# Patient Record
Sex: MALE | Race: WHITE | NOT HISPANIC OR LATINO | Employment: FULL TIME | URBAN - METROPOLITAN AREA
[De-identification: names, ages, dates, MRNs, and addresses within clinical notes are randomized per-mention and may not be internally consistent; named-entity substitution may affect disease eponyms.]

---

## 2022-04-10 ENCOUNTER — HISTORICAL (OUTPATIENT)
Dept: ADMINISTRATIVE | Facility: HOSPITAL | Age: 47
End: 2022-04-10
Payer: COMMERCIAL

## 2022-04-29 VITALS
SYSTOLIC BLOOD PRESSURE: 124 MMHG | HEIGHT: 74 IN | OXYGEN SATURATION: 97 % | WEIGHT: 223 LBS | DIASTOLIC BLOOD PRESSURE: 84 MMHG | BODY MASS INDEX: 28.62 KG/M2

## 2022-07-18 ENCOUNTER — CLINICAL SUPPORT (OUTPATIENT)
Dept: PREADMISSION TESTING | Facility: HOSPITAL | Age: 47
End: 2022-07-18
Attending: SURGERY
Payer: COMMERCIAL

## 2022-07-18 DIAGNOSIS — Z01.818 PREOPERATIVE EXAMINATION, UNSPECIFIED: Primary | ICD-10-CM

## 2022-07-18 DIAGNOSIS — Z01.818 PREOPERATIVE EXAMINATION, UNSPECIFIED: ICD-10-CM

## 2022-07-18 PROCEDURE — 93005 ELECTROCARDIOGRAM TRACING: CPT

## 2022-07-18 PROCEDURE — 93010 ELECTROCARDIOGRAM REPORT: CPT | Mod: ,,, | Performed by: INTERNAL MEDICINE

## 2022-07-18 PROCEDURE — 93010 EKG 12-LEAD: ICD-10-PCS | Mod: ,,, | Performed by: INTERNAL MEDICINE

## 2022-07-22 PROBLEM — K43.9 VENTRAL HERNIA: Status: ACTIVE | Noted: 2022-07-22

## 2022-07-22 PROBLEM — K43.9 VENTRAL HERNIA: Status: RESOLVED | Noted: 2022-07-22 | Resolved: 2022-07-22

## 2022-11-22 DIAGNOSIS — G25.81 RESTLESS LEG SYNDROME: Primary | ICD-10-CM

## 2022-11-22 RX ORDER — ALPRAZOLAM 0.5 MG/1
TABLET ORAL
Qty: 60 TABLET | Refills: 3 | Status: SHIPPED | OUTPATIENT
Start: 2022-11-22 | End: 2023-03-21 | Stop reason: SDUPTHER

## 2023-04-25 ENCOUNTER — OFFICE VISIT (OUTPATIENT)
Dept: NEUROLOGY | Facility: CLINIC | Age: 48
End: 2023-04-25
Payer: COMMERCIAL

## 2023-04-25 VITALS
DIASTOLIC BLOOD PRESSURE: 88 MMHG | BODY MASS INDEX: 28.88 KG/M2 | SYSTOLIC BLOOD PRESSURE: 144 MMHG | HEIGHT: 74 IN | WEIGHT: 225 LBS

## 2023-04-25 DIAGNOSIS — G25.81 RLS (RESTLESS LEGS SYNDROME): ICD-10-CM

## 2023-04-25 PROCEDURE — 99999 PR PBB SHADOW E&M-EST. PATIENT-LVL III: ICD-10-PCS | Mod: PBBFAC,,, | Performed by: NURSE PRACTITIONER

## 2023-04-25 PROCEDURE — 3077F SYST BP >= 140 MM HG: CPT | Mod: CPTII,S$GLB,, | Performed by: NURSE PRACTITIONER

## 2023-04-25 PROCEDURE — 4010F PR ACE/ARB THEARPY RXD/TAKEN: ICD-10-PCS | Mod: CPTII,S$GLB,, | Performed by: NURSE PRACTITIONER

## 2023-04-25 PROCEDURE — 1159F MED LIST DOCD IN RCRD: CPT | Mod: CPTII,S$GLB,, | Performed by: NURSE PRACTITIONER

## 2023-04-25 PROCEDURE — 1159F PR MEDICATION LIST DOCUMENTED IN MEDICAL RECORD: ICD-10-PCS | Mod: CPTII,S$GLB,, | Performed by: NURSE PRACTITIONER

## 2023-04-25 PROCEDURE — 3079F PR MOST RECENT DIASTOLIC BLOOD PRESSURE 80-89 MM HG: ICD-10-PCS | Mod: CPTII,S$GLB,, | Performed by: NURSE PRACTITIONER

## 2023-04-25 PROCEDURE — 4010F ACE/ARB THERAPY RXD/TAKEN: CPT | Mod: CPTII,S$GLB,, | Performed by: NURSE PRACTITIONER

## 2023-04-25 PROCEDURE — 99214 PR OFFICE/OUTPT VISIT, EST, LEVL IV, 30-39 MIN: ICD-10-PCS | Mod: S$GLB,,, | Performed by: NURSE PRACTITIONER

## 2023-04-25 PROCEDURE — 3079F DIAST BP 80-89 MM HG: CPT | Mod: CPTII,S$GLB,, | Performed by: NURSE PRACTITIONER

## 2023-04-25 PROCEDURE — 99999 PR PBB SHADOW E&M-EST. PATIENT-LVL III: CPT | Mod: PBBFAC,,, | Performed by: NURSE PRACTITIONER

## 2023-04-25 PROCEDURE — 3077F PR MOST RECENT SYSTOLIC BLOOD PRESSURE >= 140 MM HG: ICD-10-PCS | Mod: CPTII,S$GLB,, | Performed by: NURSE PRACTITIONER

## 2023-04-25 PROCEDURE — 3008F PR BODY MASS INDEX (BMI) DOCUMENTED: ICD-10-PCS | Mod: CPTII,S$GLB,, | Performed by: NURSE PRACTITIONER

## 2023-04-25 PROCEDURE — 3008F BODY MASS INDEX DOCD: CPT | Mod: CPTII,S$GLB,, | Performed by: NURSE PRACTITIONER

## 2023-04-25 PROCEDURE — 99214 OFFICE O/P EST MOD 30 MIN: CPT | Mod: S$GLB,,, | Performed by: NURSE PRACTITIONER

## 2023-04-25 RX ORDER — ALPRAZOLAM 1 MG/1
1 TABLET ORAL NIGHTLY PRN
Qty: 90 TABLET | Refills: 1 | Status: SHIPPED | OUTPATIENT
Start: 2023-04-25 | End: 2023-10-19 | Stop reason: SDUPTHER

## 2023-04-25 RX ORDER — ROPINIROLE 0.5 MG/1
TABLET, FILM COATED ORAL
Qty: 270 TABLET | Refills: 1 | Status: SHIPPED | OUTPATIENT
Start: 2023-04-25 | End: 2023-10-19 | Stop reason: SDUPTHER

## 2023-04-25 RX ORDER — LOSARTAN POTASSIUM 25 MG/1
25 TABLET ORAL EVERY MORNING
COMMUNITY
Start: 2023-04-06

## 2023-04-25 RX ORDER — HYDROCHLOROTHIAZIDE 12.5 MG/1
12.5 CAPSULE ORAL EVERY MORNING
COMMUNITY
Start: 2023-04-07

## 2023-04-25 NOTE — PROGRESS NOTES
Established Patient   SUBJECTIVE:    Patient ID: Ronal Pack , 47 y.o.    Past Medical History:   Diagnosis Date    Restless legs     Ventral hernia        Past Surgical History:   Procedure Laterality Date    KNEE CARTILAGE SURGERY Left 2007    LAPAROSCOPIC REPAIR OF VENTRAL HERNIA N/A 7/22/2022    Procedure: REPAIR, HERNIA, VENTRAL, LAPAROSCOPIC;  Surgeon: Harsh Murphy MD;  Location: [facility];  Service: General;  Laterality: N/A;       History reviewed. No pertinent family history.    Social History     Socioeconomic History    Marital status:    Tobacco Use    Smoking status: Never    Smokeless tobacco: Never   Substance and Sexual Activity    Drug use: Never       Review of patient's allergies indicates:  No Known Allergies    Chief Complaint: RLS f/u    History of Present Illness:   Pt states he is doing well with his medication regimen. States he may have 2 night of RLS/ week.     Takes Requip 0.5 mg 1 hr prior to bed, Pramipexole 0.25mg 30 min after taking Requip, then Xanax 0.5 mg, 30 min after taking Pramipexole.     Asking about making a change with medication    Review of Systems - as per HPI, otherwise a balanced 10 systems review is negative.      Current Medications:    Current Outpatient Medications:     ALPRAZolam (XANAX) 0.5 MG tablet, TAKE ONE TO TWO TABLETS BY MOUTH NIGHTLY, Disp: 60 tablet, Rfl: 1    pramipexole (MIRAPEX) 0.25 MG tablet, TAKE 1 TABLET BY MOUTH DAILY 1 HOUR BEFORE BEDTIME EVERY NIGHT ONSET, Disp: , Rfl:     rOPINIRole (REQUIP) 0.5 MG tablet, TAKE 1 TABLET BY MOUTH DAILY 2 HOURS BEFORE BEDTIME EVERY NIGHT, Disp: , Rfl:     hydroCHLOROthiazide (MICROZIDE) 12.5 mg capsule, Take 12.5 mg by mouth every morning., Disp: , Rfl:     HYDROcodone-acetaminophen (NORCO) 7.5-325 mg per tablet, Take 1 tablet by mouth every 6 (six) hours as needed for Pain. (Patient not taking: Reported on 4/25/2023), Disp: 12 tablet, Rfl: 0    losartan (COZAAR) 25 MG tablet, Take 25 mg  "by mouth every morning., Disp: , Rfl:       OBJECTIVE:    Vitals:  BP (!) 144/88   Ht 6' 2" (1.88 m)   Wt 102.1 kg (225 lb)   BMI 28.89 kg/m²      Physical Exam:  Constitutional  he appears well-developed and well-nourished. he is well groomed.    Accompanied by - self  Appearance - well appearing, no apparent distress, unassisted  Skin- no obvious lesions noted    Neurologic  Cortical function - The patient is alert, attentive, and oriented  Speech - clear   Cranial nerves:  CN 3, 4, 6 EOMs - normal. No ptosis or lateral gaze deviation  CN 7 - no face asymmetry; normal eye closure and smile  CN 8 - hearing is grossly normal  Motor - grossly normal  Gait - unassisted; posture upright. gait is steady with normal steps    Review of Data:          Labs:  No visits with results within 3 Month(s) from this visit.   Latest known visit with results is:   Lab Visit on 07/18/2022   Component Date Value Ref Range Status    Sodium Level 07/18/2022 141  136 - 145 mmol/L Final    Potassium Level 07/18/2022 4.4  3.5 - 5.1 mmol/L Final    Chloride 07/18/2022 104  98 - 107 mmol/L Final    Carbon Dioxide 07/18/2022 26  22 - 29 mmol/L Final    Glucose Level 07/18/2022 78  74 - 100 mg/dL Final    Blood Urea Nitrogen 07/18/2022 16.4  8.9 - 20.6 mg/dL Final    Creatinine 07/18/2022 1.17  0.73 - 1.18 mg/dL Final    BUN/Creatinine Ratio 07/18/2022 14   Final    Calcium Level Total 07/18/2022 9.5  8.4 - 10.2 mg/dL Final    Estimated GFR-Non  07/18/2022 >60  mls/min/1.73/m2 Final    Anion Gap 07/18/2022 11.0  mEq/L Final    WBC 07/18/2022 9.6  4.5 - 11.5 x10(3)/mcL Final    RBC 07/18/2022 5.11  4.70 - 6.10 x10(6)/mcL Final    Hgb 07/18/2022 14.6  14.0 - 18.0 gm/dL Final    Hct 07/18/2022 45.4  42.0 - 52.0 % Final    MCV 07/18/2022 88.8  80.0 - 94.0 fL Final    MCH 07/18/2022 28.6  27.0 - 31.0 pg Final    MCHC 07/18/2022 32.2 (L)  33.0 - 36.0 mg/dL Final    RDW 07/18/2022 13.4  11.5 - 17.0 % Final    Platelet 07/18/2022 " 272  130 - 400 x10(3)/mcL Final    MPV 07/18/2022 11.5 (H)  7.4 - 10.4 fL Final    Neut % 07/18/2022 66.1  % Final    Lymph % 07/18/2022 21.6  % Final    Mono % 07/18/2022 9.2  % Final    Eos % 07/18/2022 2.3  % Final    Basophil % 07/18/2022 0.6  % Final    Lymph # 07/18/2022 2.07  0.6 - 4.6 x10(3)/mcL Final    Neut # 07/18/2022 6.3  2.1 - 9.2 x10(3)/mcL Final    Mono # 07/18/2022 0.88  0.1 - 1.3 x10(3)/mcL Final    Eos # 07/18/2022 0.22  0 - 0.9 x10(3)/mcL Final    Baso # 07/18/2022 0.06  0 - 0.2 x10(3)/mcL Final    IG# 07/18/2022 0.02  0 - 0.04 x10(3)/mcL Final    IG% 07/18/2022 0.2  % Final    NRBC% 07/18/2022 0.0  % Final          ASSESSMENT /PLAN:    Problem List Items Addressed This Visit          Neuro    RLS (restless legs syndrome)    Stop Mirapex    Increase Requip to 0.5mg, 2-3 tabs nightly    Change Xanax 1mg, 1 nightly    Will order Iron panel and serum electrophoresis    F/u in 1 yr         Questions and concerns were sought and answered to the patient's stated verbal satisfaction.    The patient verbalizes understanding and agreement with the above stated treatment plan.   Dr. Garcia was available during today's encounter.     Items discussed include acute and/or chronic neurological, sleep, or other issues and their attendant differential diagnoses.  Potential for additional testing, treatment options, and prognosis also discussed.    __*_single dx ___multiple issues/ diagnoses  ___ low __ mod __*_ high complexity of data  ___low __* mod ___ high risks     Medical Decision Making (MDM) used for CPT choice:  __low  __*_moderate  ____high        ANGEL Masterson  [facility]  [phone]

## 2023-05-09 ENCOUNTER — LAB VISIT (OUTPATIENT)
Dept: LAB | Facility: HOSPITAL | Age: 48
End: 2023-05-09
Attending: NURSE PRACTITIONER
Payer: COMMERCIAL

## 2023-05-09 DIAGNOSIS — G25.81 RLS (RESTLESS LEGS SYNDROME): ICD-10-CM

## 2023-05-09 PROCEDURE — 36415 COLL VENOUS BLD VENIPUNCTURE: CPT

## 2023-05-09 PROCEDURE — 84165 PROTEIN E-PHORESIS SERUM: CPT

## 2023-05-10 LAB
ALBUMIN % SPEP (OHS): 46.09 (ref 48.1–59.5)
ALBUMIN SERPL BCP-MCNC: 3.5 G/DL
ALBUMIN/GLOB SERPL: 0.9 RATIO
ALPHA 1 GLOB (OHS): 0.32 GM/DL (ref 0–0.4)
ALPHA 1 GLOB% (OHS): 4.25 (ref 2.3–4.9)
ALPHA 2 GLOB % (OHS): 10.98 (ref 6.9–13)
ALPHA 2 GLOB (OHS): 0.82 GM/DL (ref 0.4–1)
BETA GLOB (OHS): 1.29 GM/DL (ref 0.7–1.3)
BETA GLOB% (OHS): 17.14 (ref 13.8–19.7)
GAMMA GLOBULIN % (OHS): 21.55 (ref 10.1–21.9)
GAMMA GLOBULIN (OHS): 1.62 GM/DL (ref 0.4–1.8)
GLOBULIN SER-MCNC: 4 GM/DL
M SPIKE % (OHS): ABNORMAL
M SPIKE (OHS): ABNORMAL
PATH REV: NORMAL
PROT SERPL-MCNC: 7.5 GM/DL (ref 6.4–8.3)

## 2023-05-11 ENCOUNTER — TELEPHONE (OUTPATIENT)
Dept: NEUROLOGY | Facility: CLINIC | Age: 48
End: 2023-05-11
Payer: COMMERCIAL

## 2023-05-11 DIAGNOSIS — G25.81 RLS (RESTLESS LEGS SYNDROME): Primary | ICD-10-CM

## 2023-05-11 LAB
IRON SATN MFR SERPL: 20 % (ref 20–50)
IRON SERPL-MCNC: 55 UG/DL (ref 65–175)
TIBC SERPL-MCNC: 219 UG/DL (ref 69–240)
TIBC SERPL-MCNC: 274 UG/DL (ref 250–450)
TRANSFERRIN SERPL-MCNC: 246 MG/DL (ref 174–364)

## 2023-05-11 NOTE — TELEPHONE ENCOUNTER
----- Message from Diana Morales, PAMELA-C sent at 5/11/2023 11:23 AM CDT -----  I also ordered iron panel. Can you find out why that wasn't done pls?

## 2023-05-23 ENCOUNTER — TELEPHONE (OUTPATIENT)
Dept: NEUROLOGY | Facility: CLINIC | Age: 48
End: 2023-05-23
Payer: COMMERCIAL

## 2023-05-23 ENCOUNTER — PATIENT MESSAGE (OUTPATIENT)
Dept: NEUROLOGY | Facility: CLINIC | Age: 48
End: 2023-05-23
Payer: COMMERCIAL

## 2023-05-26 ENCOUNTER — HOSPITAL ENCOUNTER (EMERGENCY)
Facility: HOSPITAL | Age: 48
Discharge: HOME | End: 2023-05-26
Attending: EMERGENCY MEDICINE
Payer: MEDICARE

## 2023-05-26 VITALS
TEMPERATURE: 98 F | SYSTOLIC BLOOD PRESSURE: 159 MMHG | OXYGEN SATURATION: 98 % | WEIGHT: 225 LBS | RESPIRATION RATE: 18 BRPM | HEART RATE: 62 BPM | HEIGHT: 74 IN | DIASTOLIC BLOOD PRESSURE: 106 MMHG | BODY MASS INDEX: 28.88 KG/M2

## 2023-05-26 DIAGNOSIS — N20.0 KIDNEY STONE: Primary | ICD-10-CM

## 2023-05-26 LAB
ALBUMIN SERPL-MCNC: 4.3 G/DL (ref 3.5–5)
ALBUMIN/GLOB SERPL: 1.3 RATIO (ref 1.1–2)
ALP SERPL-CCNC: 63 UNIT/L (ref 40–150)
ALT SERPL-CCNC: 37 UNIT/L (ref 0–55)
APPEARANCE UR: ABNORMAL
AST SERPL-CCNC: 24 UNIT/L (ref 5–34)
BACTERIA #/AREA URNS AUTO: ABNORMAL /HPF
BASOPHILS # BLD AUTO: 0.05 X10(3)/MCL
BASOPHILS NFR BLD AUTO: 0.6 %
BILIRUB UR QL STRIP.AUTO: NEGATIVE MG/DL
BILIRUBIN DIRECT+TOT PNL SERPL-MCNC: 0.3 MG/DL
BUN SERPL-MCNC: 19 MG/DL (ref 8.9–20.6)
CALCIUM SERPL-MCNC: 9.6 MG/DL (ref 8.4–10.2)
CHLORIDE SERPL-SCNC: 105 MMOL/L (ref 98–107)
CO2 SERPL-SCNC: 28 MMOL/L (ref 22–29)
COLOR UR: ABNORMAL
CREAT SERPL-MCNC: 0.99 MG/DL (ref 0.73–1.18)
EOSINOPHIL # BLD AUTO: 0.17 X10(3)/MCL (ref 0–0.9)
EOSINOPHIL NFR BLD AUTO: 2.2 %
ERYTHROCYTE [DISTWIDTH] IN BLOOD BY AUTOMATED COUNT: 13.1 % (ref 11.5–17)
GFR SERPLBLD CREATININE-BSD FMLA CKD-EPI: >60 MLS/MIN/1.73/M2
GLOBULIN SER-MCNC: 3.2 GM/DL (ref 2.4–3.5)
GLUCOSE SERPL-MCNC: 94 MG/DL (ref 74–100)
GLUCOSE UR QL STRIP.AUTO: NEGATIVE MG/DL
HCT VFR BLD AUTO: 44.3 % (ref 42–52)
HGB BLD-MCNC: 14.4 G/DL (ref 14–18)
IMM GRANULOCYTES # BLD AUTO: 0.01 X10(3)/MCL (ref 0–0.04)
IMM GRANULOCYTES NFR BLD AUTO: 0.1 %
KETONES UR QL STRIP.AUTO: NEGATIVE MG/DL
LEUKOCYTE ESTERASE UR QL STRIP.AUTO: NEGATIVE UNIT/L
LIPASE SERPL-CCNC: 33 U/L
LYMPHOCYTES # BLD AUTO: 1.85 X10(3)/MCL (ref 0.6–4.6)
LYMPHOCYTES NFR BLD AUTO: 23.5 %
MCH RBC QN AUTO: 28.9 PG (ref 27–31)
MCHC RBC AUTO-ENTMCNC: 32.5 G/DL (ref 33–36)
MCV RBC AUTO: 89 FL (ref 80–94)
MONOCYTES # BLD AUTO: 0.97 X10(3)/MCL (ref 0.1–1.3)
MONOCYTES NFR BLD AUTO: 12.3 %
NEUTROPHILS # BLD AUTO: 4.82 X10(3)/MCL (ref 2.1–9.2)
NEUTROPHILS NFR BLD AUTO: 61.3 %
NITRITE UR QL STRIP.AUTO: NEGATIVE
NRBC BLD AUTO-RTO: 0 %
PH UR STRIP.AUTO: 5.5 [PH]
PLATELET # BLD AUTO: 266 X10(3)/MCL (ref 130–400)
PMV BLD AUTO: 11.5 FL (ref 7.4–10.4)
POTASSIUM SERPL-SCNC: 4 MMOL/L (ref 3.5–5.1)
PROT SERPL-MCNC: 7.5 GM/DL (ref 6.4–8.3)
PROT UR QL STRIP.AUTO: ABNORMAL MG/DL
RBC # BLD AUTO: 4.98 X10(6)/MCL (ref 4.7–6.1)
RBC #/AREA URNS AUTO: >100 /HPF
RBC UR QL AUTO: ABNORMAL UNIT/L
SODIUM SERPL-SCNC: 142 MMOL/L (ref 136–145)
SP GR UR STRIP.AUTO: >=1.03
SQUAMOUS #/AREA URNS AUTO: ABNORMAL /HPF
UROBILINOGEN UR STRIP-ACNC: 0.2 MG/DL
WBC # SPEC AUTO: 7.87 X10(3)/MCL (ref 4.5–11.5)
WBC #/AREA URNS AUTO: ABNORMAL /HPF

## 2023-05-26 PROCEDURE — 99285 EMERGENCY DEPT VISIT HI MDM: CPT | Mod: 25

## 2023-05-26 PROCEDURE — 80053 COMPREHEN METABOLIC PANEL: CPT | Performed by: EMERGENCY MEDICINE

## 2023-05-26 PROCEDURE — 85025 COMPLETE CBC W/AUTO DIFF WBC: CPT | Performed by: EMERGENCY MEDICINE

## 2023-05-26 PROCEDURE — 83690 ASSAY OF LIPASE: CPT | Performed by: EMERGENCY MEDICINE

## 2023-05-26 PROCEDURE — 25500020 PHARM REV CODE 255: Performed by: EMERGENCY MEDICINE

## 2023-05-26 PROCEDURE — 81001 URINALYSIS AUTO W/SCOPE: CPT | Performed by: EMERGENCY MEDICINE

## 2023-05-26 RX ORDER — KETOROLAC TROMETHAMINE 10 MG/1
10 TABLET, FILM COATED ORAL EVERY 6 HOURS
Qty: 20 TABLET | Refills: 0 | Status: SHIPPED | OUTPATIENT
Start: 2023-05-26 | End: 2023-05-31

## 2023-05-26 RX ORDER — PROMETHAZINE HYDROCHLORIDE 25 MG/1
25 TABLET ORAL EVERY 6 HOURS PRN
Qty: 20 TABLET | Refills: 0 | Status: SHIPPED | OUTPATIENT
Start: 2023-05-26

## 2023-05-26 RX ORDER — HYDROCODONE BITARTRATE AND ACETAMINOPHEN 5; 325 MG/1; MG/1
1 TABLET ORAL EVERY 6 HOURS PRN
Qty: 12 TABLET | Refills: 0 | Status: SHIPPED | OUTPATIENT
Start: 2023-05-26

## 2023-05-26 RX ORDER — TAMSULOSIN HYDROCHLORIDE 0.4 MG/1
0.4 CAPSULE ORAL DAILY
Qty: 10 CAPSULE | Refills: 0 | Status: SHIPPED | OUTPATIENT
Start: 2023-05-26 | End: 2024-05-25

## 2023-05-26 RX ORDER — PROMETHAZINE HYDROCHLORIDE 25 MG/1
25 TABLET ORAL EVERY 6 HOURS PRN
Qty: 20 TABLET | Refills: 0 | Status: SHIPPED | OUTPATIENT
Start: 2023-05-26 | End: 2023-05-26 | Stop reason: SDUPTHER

## 2023-05-26 RX ADMIN — IOPAMIDOL 100 ML: 755 INJECTION, SOLUTION INTRAVENOUS at 11:05

## 2023-05-26 NOTE — ED PROVIDER NOTES
Encounter Date: 5/26/2023       History     Chief Complaint   Patient presents with    Abdominal Pain     Pt to er c/o low abd pain and blood in urine onset this morning. States has also had diarrhea since yesterday.     Patient is a 46 yo M presenting with Abdominal pain. Yesterday he had some diarrhea. No blood or black stools that he noticed. Urine looked dark but no blood. Then the pain started this morning, suprapubic and left sided with radiating down. Also noticed blood in his urine this morning that has become more prominent. Having some associated dysuria. He denies any fevers, chills, chest pain, shortness of breath, abdominal pain, nausea, vomiting, or other complaints. They have otherwise been at their baseline health. Currently the pain is 8/10 but declined pain medication for now. Has a hx of kidney stones and diverticullitis. On medications for HTN and restless leg.         Review of patient's allergies indicates:  No Known Allergies  Past Medical History:   Diagnosis Date    Restless legs     Ventral hernia      Past Surgical History:   Procedure Laterality Date    KNEE CARTILAGE SURGERY Left 2007    LAPAROSCOPIC REPAIR OF VENTRAL HERNIA N/A 7/22/2022    Procedure: REPAIR, HERNIA, VENTRAL, LAPAROSCOPIC;  Surgeon: Harsh Murphy MD;  Location: [facility];  Service: General;  Laterality: N/A;     No family history on file.  Social History     Tobacco Use    Smoking status: Never    Smokeless tobacco: Never   Substance Use Topics    Drug use: Never     Review of Systems   Constitutional:  Negative for fever.   HENT:  Negative for sore throat.    Respiratory:  Negative for shortness of breath.    Cardiovascular:  Negative for chest pain.   Gastrointestinal:  Positive for abdominal pain and diarrhea. Negative for blood in stool, nausea and vomiting.   Genitourinary:  Positive for dysuria.   Musculoskeletal:  Negative for back pain.   Skin:  Negative for rash.   Neurological:  Negative for weakness.    Hematological:  Does not bruise/bleed easily.     Physical Exam     Initial Vitals [05/26/23 0955]   BP Pulse Resp Temp SpO2   (!) 159/106 62 18 97.5 °F (36.4 °C) 98 %      MAP       --         Physical Exam    Nursing note and vitals reviewed.  Constitutional: He appears well-developed and well-nourished. He is not diaphoretic. No distress.   HENT:   Head: Normocephalic and atraumatic.   Eyes: Conjunctivae are normal.   Neck: Neck supple.   Cardiovascular:  Normal rate, regular rhythm and normal heart sounds.           Pulmonary/Chest: Breath sounds normal. No respiratory distress. He has no wheezes. He has no rhonchi.   Abdominal: Abdomen is soft. Bowel sounds are normal. He exhibits no distension. There is abdominal tenderness (diffusely ttp of the suprapubic, LLQ, RLQ regiosn with some guarding). There is no rebound and no guarding.   Musculoskeletal:         General: No edema. Normal range of motion.      Cervical back: Neck supple.     Neurological: He is alert and oriented to person, place, and time.   Skin: Skin is warm and dry.   Psychiatric: He has a normal mood and affect. Thought content normal.       ED Course   Procedures  Labs Reviewed   URINALYSIS, REFLEX TO URINE CULTURE - Abnormal; Notable for the following components:       Result Value    Appearance, UA Cloudy (*)     Protein, UA Trace (*)     Blood, UA Large (*)     All other components within normal limits   CBC WITH DIFFERENTIAL - Abnormal; Notable for the following components:    MCHC 32.5 (*)     MPV 11.5 (*)     All other components within normal limits   URINALYSIS, MICROSCOPIC - Abnormal; Notable for the following components:    Bacteria, UA Few (*)     RBC, UA >100 (*)     All other components within normal limits   LIPASE - Normal   CBC W/ AUTO DIFFERENTIAL    Narrative:     The following orders were created for panel order CBC W/ AUTO DIFFERENTIAL.  Procedure                               Abnormality         Status                      ---------                               -----------         ------                     CBC with Differential[[id number]]        Abnormal            Final result                 Please view results for these tests on the individual orders.   COMPREHENSIVE METABOLIC PANEL          Imaging Results              CT Abdomen Pelvis With Contrast (Final result)  Result time 05/26/23 11:22:59      Final result by Rome Gaming MD (05/26/23 11:22:59)                   Impression:      Distal right ureteral 3-4 mm calculus. No hydronephrosis.      Electronically signed by: Rome Gaming  Date:    05/26/2023  Time:    11:22               Narrative:    EXAMINATION:  CT ABDOMEN PELVIS WITH CONTRAST    CLINICAL HISTORY:  LLQ abdominal pain;    TECHNIQUE:  Helical acquisition through the abdomen and pelvis with IV contrast.  Three plane reconstructions were provided for review.  mGycm. Automatic exposure control, adjustment of mA/kV or iterative reconstruction technique was used to reduce radiation.    COMPARISON:  No priors    FINDINGS:  The limited imaged lung bases are clear.    Liver is mildly enlarged.  No significant abnormality gallbladder, spleen, pancreas or adrenals.    No hydronephrosis.  There is a pelvic calcification measuring 3-4 mm on image 80 series 2.  This appears to be within the distal right ureter.    No bowel obstruction. No significant inflammatory changes of the bowel.  Normal appendix.  No free air.    Urinary bladder is unremarkable. No free fluid. Aorta normal in caliber.  Minimal atherosclerotic disease.    No acute osseous findings.  There are postsurgical changes of the anterior abdominal wall.                                       Medications   iopamidoL (ISOVUE-370) injection 100 mL (100 mLs Intravenous Given 5/26/23 1111)     Medical Decision Making:   Results were reviewed with patient. Questions were answered along with a discussion of signs and symptoms to return for. Patient  comfortable with plan of discharge.             ED Course as of 05/27/23 1449   Fri May 26, 2023   1137 Results were reviewed with patient. Questions were answered along with a discussion of signs and symptoms to return for. Patient comfortable with plan of discharge.   [GM]      ED Course User Index  [GM] Collette Ferreira MD                 Clinical Impression:   Final diagnoses:  [N20.0] Kidney stone (Primary)        ED Disposition Condition    Discharge Stable          ED Prescriptions       Medication Sig Dispense Start Date End Date Auth. Provider    promethazine (PHENERGAN) 25 MG tablet  (Status: Discontinued) Take 1 tablet (25 mg total) by mouth every 6 (six) hours as needed for Nausea. 20 tablet 5/26/2023 5/26/2023 Collette Ferreira MD    tamsulosin (FLOMAX) 0.4 mg Cap Take 1 capsule (0.4 mg total) by mouth once daily. 10 capsule 5/26/2023 5/25/2024 Collette Ferreira MD    HYDROcodone-acetaminophen (NORCO) 5-325 mg per tablet Take 1 tablet by mouth every 6 (six) hours as needed for Pain. 12 tablet 5/26/2023 -- Collette Ferreira MD    ketorolac (TORADOL) 10 mg tablet Take 1 tablet (10 mg total) by mouth every 6 (six) hours. for 5 days 20 tablet 5/26/2023 5/31/2023 Collette Ferreira MD    promethazine (PHENERGAN) 25 MG tablet Take 1 tablet (25 mg total) by mouth every 6 (six) hours as needed for Nausea. 20 tablet 5/26/2023 -- Collette Ferreira MD          Follow-up Information       Follow up With Specialties Details Why Contact Info    Brandon Blake MD Urology Schedule an appointment as soon as possible for a visit  If symptoms worsen, return to the  [address] [facility]  [address]  [facility] [address] [phone]      Cristopher Reynolds, DO Family Medicine  For elevated blood pressure. This may just be due to the pain you have today but follow up if it remains elevated [address]  [address]  [place] [address]  [phone]               Collette Ferreira MD  05/27/23 9048

## 2023-05-26 NOTE — ED TRIAGE NOTES
Pt to er c/o low abd pain and blood in urine onset this morning. States has also had diarrhea since yesterday.

## 2023-05-26 NOTE — DISCHARGE INSTRUCTIONS
Do not take your alprazolam while taking the pain medication. When taken together, they increase the risk of accidental overdose. Your medications were sent to the [facility] [address] [facility]

## 2023-05-26 NOTE — Clinical Note
"Ronal Cooper" Ramone was seen and treated in our emergency department on 5/26/2023.  He may return to work on 05/29/2023.       If you have any questions or concerns, please don't hesitate to call.      Aleksandar Hall RN RN    "

## 2023-07-10 ENCOUNTER — HOSPITAL ENCOUNTER (EMERGENCY)
Facility: HOSPITAL | Age: 48
Discharge: HOME | End: 2023-07-10
Attending: EMERGENCY MEDICINE
Payer: COMMERCIAL

## 2023-07-10 VITALS
WEIGHT: 225 LBS | RESPIRATION RATE: 18 BRPM | SYSTOLIC BLOOD PRESSURE: 141 MMHG | HEART RATE: 68 BPM | HEIGHT: 74 IN | BODY MASS INDEX: 28.88 KG/M2 | TEMPERATURE: 98 F | OXYGEN SATURATION: 97 % | DIASTOLIC BLOOD PRESSURE: 95 MMHG

## 2023-07-10 DIAGNOSIS — K56.7 ILEUS: Primary | ICD-10-CM

## 2023-07-10 DIAGNOSIS — R19.7 VOMITING AND DIARRHEA: ICD-10-CM

## 2023-07-10 DIAGNOSIS — R11.10 VOMITING AND DIARRHEA: ICD-10-CM

## 2023-07-10 LAB
ALBUMIN SERPL-MCNC: 4.5 G/DL (ref 3.5–5)
ALBUMIN/GLOB SERPL: 1.3 RATIO (ref 1.1–2)
ALP SERPL-CCNC: 65 UNIT/L (ref 40–150)
ALT SERPL-CCNC: 49 UNIT/L (ref 0–55)
AST SERPL-CCNC: 27 UNIT/L (ref 5–34)
BASOPHILS # BLD AUTO: 0.03 X10(3)/MCL
BASOPHILS NFR BLD AUTO: 0.4 %
BILIRUBIN DIRECT+TOT PNL SERPL-MCNC: 0.8 MG/DL
BUN SERPL-MCNC: 16.5 MG/DL (ref 8.9–20.6)
CALCIUM SERPL-MCNC: 9.4 MG/DL (ref 8.4–10.2)
CHLORIDE SERPL-SCNC: 101 MMOL/L (ref 98–107)
CO2 SERPL-SCNC: 29 MMOL/L (ref 22–29)
CREAT SERPL-MCNC: 1.07 MG/DL (ref 0.73–1.18)
EOSINOPHIL # BLD AUTO: 0.04 X10(3)/MCL (ref 0–0.9)
EOSINOPHIL NFR BLD AUTO: 0.5 %
ERYTHROCYTE [DISTWIDTH] IN BLOOD BY AUTOMATED COUNT: 13.2 % (ref 11.5–17)
GFR SERPLBLD CREATININE-BSD FMLA CKD-EPI: >60 MLS/MIN/1.73/M2
GLOBULIN SER-MCNC: 3.5 GM/DL (ref 2.4–3.5)
GLUCOSE SERPL-MCNC: 117 MG/DL (ref 74–100)
HCT VFR BLD AUTO: 49.9 % (ref 42–52)
HGB BLD-MCNC: 16.4 G/DL (ref 14–18)
IMM GRANULOCYTES # BLD AUTO: 0.02 X10(3)/MCL (ref 0–0.04)
IMM GRANULOCYTES NFR BLD AUTO: 0.2 %
LIPASE SERPL-CCNC: 12 U/L
LYMPHOCYTES # BLD AUTO: 1.31 X10(3)/MCL (ref 0.6–4.6)
LYMPHOCYTES NFR BLD AUTO: 16.2 %
MAGNESIUM SERPL-MCNC: 2.3 MG/DL (ref 1.6–2.6)
MCH RBC QN AUTO: 28.7 PG (ref 27–31)
MCHC RBC AUTO-ENTMCNC: 32.9 G/DL (ref 33–36)
MCV RBC AUTO: 87.2 FL (ref 80–94)
MONOCYTES # BLD AUTO: 1.04 X10(3)/MCL (ref 0.1–1.3)
MONOCYTES NFR BLD AUTO: 12.9 %
NEUTROPHILS # BLD AUTO: 5.63 X10(3)/MCL (ref 2.1–9.2)
NEUTROPHILS NFR BLD AUTO: 69.8 %
NRBC BLD AUTO-RTO: 0 %
PLATELET # BLD AUTO: 253 X10(3)/MCL (ref 130–400)
PMV BLD AUTO: 11.5 FL (ref 7.4–10.4)
POTASSIUM SERPL-SCNC: 4 MMOL/L (ref 3.5–5.1)
PROT SERPL-MCNC: 8 GM/DL (ref 6.4–8.3)
RBC # BLD AUTO: 5.72 X10(6)/MCL (ref 4.7–6.1)
SODIUM SERPL-SCNC: 141 MMOL/L (ref 136–145)
TROPONIN I SERPL-MCNC: <0.01 NG/ML (ref 0–0.04)
WBC # SPEC AUTO: 8.07 X10(3)/MCL (ref 4.5–11.5)

## 2023-07-10 PROCEDURE — 99285 EMERGENCY DEPT VISIT HI MDM: CPT | Mod: 25

## 2023-07-10 PROCEDURE — 84484 ASSAY OF TROPONIN QUANT: CPT | Performed by: EMERGENCY MEDICINE

## 2023-07-10 PROCEDURE — 25500020 PHARM REV CODE 255: Performed by: EMERGENCY MEDICINE

## 2023-07-10 PROCEDURE — 83735 ASSAY OF MAGNESIUM: CPT | Performed by: EMERGENCY MEDICINE

## 2023-07-10 PROCEDURE — 96374 THER/PROPH/DIAG INJ IV PUSH: CPT

## 2023-07-10 PROCEDURE — 63600175 PHARM REV CODE 636 W HCPCS: Performed by: EMERGENCY MEDICINE

## 2023-07-10 PROCEDURE — 25000003 PHARM REV CODE 250: Performed by: EMERGENCY MEDICINE

## 2023-07-10 PROCEDURE — 83690 ASSAY OF LIPASE: CPT | Performed by: EMERGENCY MEDICINE

## 2023-07-10 PROCEDURE — 85025 COMPLETE CBC W/AUTO DIFF WBC: CPT | Performed by: EMERGENCY MEDICINE

## 2023-07-10 PROCEDURE — 96375 TX/PRO/DX INJ NEW DRUG ADDON: CPT

## 2023-07-10 PROCEDURE — 80053 COMPREHEN METABOLIC PANEL: CPT | Performed by: EMERGENCY MEDICINE

## 2023-07-10 RX ORDER — ONDANSETRON 2 MG/ML
4 INJECTION INTRAMUSCULAR; INTRAVENOUS
Status: COMPLETED | OUTPATIENT
Start: 2023-07-10 | End: 2023-07-10

## 2023-07-10 RX ORDER — FAMOTIDINE 10 MG/ML
20 INJECTION INTRAVENOUS
Status: COMPLETED | OUTPATIENT
Start: 2023-07-10 | End: 2023-07-10

## 2023-07-10 RX ORDER — SODIUM CHLORIDE 9 MG/ML
1000 INJECTION, SOLUTION INTRAVENOUS
Status: COMPLETED | OUTPATIENT
Start: 2023-07-10 | End: 2023-07-10

## 2023-07-10 RX ORDER — ONDANSETRON 4 MG/1
4 TABLET, ORALLY DISINTEGRATING ORAL EVERY 6 HOURS PRN
Qty: 10 TABLET | Refills: 0 | Status: SHIPPED | OUTPATIENT
Start: 2023-07-10

## 2023-07-10 RX ADMIN — SODIUM CHLORIDE 1000 ML: 9 INJECTION, SOLUTION INTRAVENOUS at 09:07

## 2023-07-10 RX ADMIN — IOPAMIDOL 100 ML: 755 INJECTION, SOLUTION INTRAVENOUS at 10:07

## 2023-07-10 RX ADMIN — ONDANSETRON HYDROCHLORIDE 4 MG: 2 SOLUTION INTRAMUSCULAR; INTRAVENOUS at 09:07

## 2023-07-10 RX ADMIN — FAMOTIDINE 20 MG: 10 INJECTION, SOLUTION INTRAVENOUS at 09:07

## 2023-07-10 NOTE — ED TRIAGE NOTES
Pt c/o abd pain onset Saturday along with n/v/d. Pt also c/o feeling fatigue and blood in urine this morning.

## 2023-07-10 NOTE — Clinical Note
"Ronal Cooper" Ramone was seen and treated in our emergency department on 7/10/2023.  He may return to work on 07/13/2023.       If you have any questions or concerns, please don't hesitate to call.       RN    "

## 2023-07-10 NOTE — DISCHARGE INSTRUCTIONS
Return to the emergency room for any worsening symptoms.  Drink fluids and I would not take anymore diarrhea medicine.  Zofran as needed for nausea.  Follow-up with your doctor as soon as possible recheck

## 2023-07-10 NOTE — ED PROVIDER NOTES
"Encounter Date: 7/10/2023       History     Chief Complaint   Patient presents with    Abdominal Pain     Pt c/o abd pain onset Saturday along with n/v/d. Pt also c/o feeling fatigue and blood in urine this morning.       47-year-old male complains of nausea vomiting abdominal pain which started 2 days ago.  He also had profuse watery diarrhea, last bowel movement was last night and that seems to have stopped since taking a dose of Lomotil.  No chest pain, shortness of breath, URI symptoms.  Last vomiting episode was 6:00 a.m..  He states the is yellow and stool is "light" in color with no blood.  He had an umbilical hernia repair last year and his pain is epigastric, upper abdominal region and somewhat right upper quadrant.      Review of patient's allergies indicates:  No Known Allergies  Past Medical History:   Diagnosis Date    Restless legs     Ventral hernia      Past Surgical History:   Procedure Laterality Date    KNEE CARTILAGE SURGERY Left 2007    LAPAROSCOPIC REPAIR OF VENTRAL HERNIA N/A 7/22/2022    Procedure: REPAIR, HERNIA, VENTRAL, LAPAROSCOPIC;  Surgeon: Harsh Murphy MD;  Location: [facility];  Service: General;  Laterality: N/A;     No family history on file.  Social History     Tobacco Use    Smoking status: Never    Smokeless tobacco: Never   Substance Use Topics    Drug use: Never     Review of Systems   Gastrointestinal:  Positive for abdominal pain, diarrhea, nausea and vomiting.   All other systems reviewed and are negative.    Physical Exam     Initial Vitals [07/10/23 0842]   BP Pulse Resp Temp SpO2   122/89 94 18 97.9 °F (36.6 °C) 98 %      MAP       --         Physical Exam    Nursing note and vitals reviewed.  Constitutional: Vital signs are normal. He appears well-developed and well-nourished.   HENT:   Head: Normocephalic and atraumatic.   Mouth/Throat: Oropharynx is clear and moist.   Eyes: Pupils are equal, round, and reactive to light.   Neck: Neck supple. No JVD present. "   Cardiovascular:  Normal rate, regular rhythm and normal heart sounds.           Pulmonary/Chest: Breath sounds normal. No respiratory distress.   Abdominal: Abdomen is soft. There is abdominal tenderness.   Mild epigastric, upper abdominal, right upper quadrant tenderness with no rebound or guarding and no palpable abnormality.   Musculoskeletal:         General: No edema.      Cervical back: Neck supple. No edema or erythema.     Lymphadenopathy:     He has no cervical adenopathy.   Neurological: He is alert and oriented to person, place, and time. No cranial nerve deficit. GCS score is 15. GCS eye subscore is 4. GCS verbal subscore is 5. GCS motor subscore is 6.   Skin: Skin is warm and dry. Capillary refill takes less than 2 seconds.       ED Course   Procedures  Labs Reviewed   CBC WITH DIFFERENTIAL - Abnormal; Notable for the following components:       Result Value    MCHC 32.9 (*)     MPV 11.5 (*)     All other components within normal limits   COMPREHENSIVE METABOLIC PANEL - Abnormal; Notable for the following components:    Glucose Level 117 (*)     All other components within normal limits   TROPONIN I - Normal   LIPASE - Normal   MAGNESIUM - Normal          Imaging Results              CT Abdomen Pelvis With Contrast (Final result)  Result time 07/10/23 10:42:57      Final result by Rome Gaming MD (07/10/23 10:42:57)                   Impression:      Mildly dilated small bowel loops without a discrete transition point, favor ileus over obstruction.      Electronically signed by: Rome Gaming  Date:    07/10/2023  Time:    10:42               Narrative:    EXAMINATION:  CT ABDOMEN PELVIS WITH CONTRAST    CLINICAL HISTORY:  Bowel obstruction suspected;Epigastric pain;    TECHNIQUE:  Helical acquisition through the abdomen and pelvis with IV contrast.  Three plane reconstructions were provided for review.  mGycm. Automatic exposure control, adjustment of mA/kV or iterative reconstruction  "technique was used to reduce radiation.    COMPARISON:  26 May 2023    FINDINGS:  Mild atelectasis or scarring at the right lung base.    Liver is mildly enlarged.  Suspect hepatic steatosis.  No significant abnormality gallbladder, spleen, pancreas or adrenals.  No hydronephrosis.  No suspicious renal lesion.    There are some mildly dilated small bowel loops measuring up to about 3 cm.  There is gradual transition to decompressed small bowel distally.  Normal appendix.  There is colonic diverticulosis without evidence of diverticulitis.    Urinary bladder is unremarkable. No free fluid. Aorta normal in caliber.  Minimal atherosclerotic disease.    No acute osseous findings.  There are postsurgical changes of the anterior abdominal wall.                                       Medications   0.9%  NaCl infusion (1,000 mLs Intravenous New Bag 7/10/23 0900)   ondansetron injection 4 mg (4 mg Intravenous Given 7/10/23 0900)   famotidine (PF) injection 20 mg (20 mg Intravenous Given 7/10/23 0900)   iopamidoL (ISOVUE-370) injection 100 mL (100 mLs Intravenous Given 7/10/23 1018)     Medical Decision Making:   Initial Assessment:   47-year-old male complains of nausea vomiting abdominal pain which started 2 days ago.  He also had profuse watery diarrhea, last bowel movement was last night and that seems to have stopped since taking a dose of Lomotil.  No chest pain, shortness of breath, URI symptoms.  Last vomiting episode was 6:00 a.m..  He states the is yellow and stool is "light" in color with no blood.  He had an umbilical hernia repair last year and his pain is epigastric, upper abdominal region and somewhat right upper quadrant.      Differential Diagnosis:   Differential diagnosis includes but is not limited to gastroenteritis, gastritis, cholecystitis, bowel obstruction  Clinical Tests:   Lab Tests: Reviewed  Radiological Study: Reviewed  ED Management:  Patient was seen and evaluated in the emergency department " with history, physical exam, lab work, imaging.  His lab work is benign and the imaging is consistent with an ileus.  He was given IV fluids, Pepcid, Zofran and seems to be feeling much better at this time.  There is no sign of bacterial infection so do not believe we need to start him on antibiotics.  I will give him a prescription for Zofran to have at home and recommend he follow-up with his PCP for further care and make sure he is having adequate fluid intake.           ED Course as of 07/11/23 0838   Mon Jul 10, 2023   1055 Patient states his nausea has resolved and he feels much better in general.  However, upon standing after the CT he got very lightheaded and had to sit back down.  Lab work is benign and CT scan is consistent with ileus.  He is received 1 L of saline so will check some orthostatic vital signs. [SH]   1113 Orthostatic vital signs look good, lying blood pressure 126/90 heart rate 64, sitting blood pressure 135/88, heart rate 63.  Standing blood pressure 141/95, heart rate 68.  He is stable for discharge home and all questions were answered. [SH]      ED Course User Index  [SH] Asha Toribio MD                 Clinical Impression:   Final diagnoses:  [K56.7] Ileus (Primary)  [R11.10, R19.7] Vomiting and diarrhea        ED Disposition Condition    Discharge Stable          ED Prescriptions       Medication Sig Dispense Start Date End Date Auth. Provider    ondansetron (ZOFRAN-ODT) 4 MG TbDL Take 1 tablet (4 mg total) by mouth every 6 (six) hours as needed (nausea). 10 tablet 7/10/2023 -- Asha Toribio MD          Follow-up Information       Follow up With Specialties Details Why Contact Info    Cristopher Reynolds DO Family Medicine Schedule an appointment as soon as possible for a visit   [address]  [address] [address]  [phone]               Asha Toribio MD  07/11/23 3844

## 2023-10-19 DIAGNOSIS — G25.81 RLS (RESTLESS LEGS SYNDROME): ICD-10-CM

## 2023-10-20 RX ORDER — ALPRAZOLAM 1 MG/1
1 TABLET ORAL NIGHTLY PRN
Qty: 90 TABLET | Refills: 1 | Status: SHIPPED | OUTPATIENT
Start: 2023-10-20

## 2023-10-20 RX ORDER — ROPINIROLE 0.5 MG/1
TABLET, FILM COATED ORAL
Qty: 270 TABLET | Refills: 1 | Status: SHIPPED | OUTPATIENT
Start: 2023-10-20

## 2024-02-21 ENCOUNTER — HOSPITAL ENCOUNTER (OUTPATIENT)
Dept: RADIOLOGY | Facility: CLINIC | Age: 49
Discharge: HOME | End: 2024-02-21
Attending: PHYSICIAN ASSISTANT
Payer: COMMERCIAL

## 2024-02-21 ENCOUNTER — OFFICE VISIT (OUTPATIENT)
Dept: ORTHOPEDICS | Facility: CLINIC | Age: 49
End: 2024-02-21
Payer: COMMERCIAL

## 2024-02-21 VITALS
HEART RATE: 89 BPM | BODY MASS INDEX: 28.88 KG/M2 | SYSTOLIC BLOOD PRESSURE: 145 MMHG | DIASTOLIC BLOOD PRESSURE: 98 MMHG | HEIGHT: 74 IN | WEIGHT: 225 LBS

## 2024-02-21 DIAGNOSIS — M25.562 LEFT KNEE PAIN, UNSPECIFIED CHRONICITY: ICD-10-CM

## 2024-02-21 DIAGNOSIS — M17.12 PRIMARY OSTEOARTHRITIS OF LEFT KNEE: Primary | ICD-10-CM

## 2024-02-21 PROCEDURE — 73564 X-RAY EXAM KNEE 4 OR MORE: CPT | Mod: LT,,, | Performed by: PHYSICIAN ASSISTANT

## 2024-02-21 PROCEDURE — 3077F SYST BP >= 140 MM HG: CPT | Mod: CPTII,,, | Performed by: PHYSICIAN ASSISTANT

## 2024-02-21 PROCEDURE — 99202 OFFICE O/P NEW SF 15 MIN: CPT | Mod: 25,,, | Performed by: PHYSICIAN ASSISTANT

## 2024-02-21 PROCEDURE — 3080F DIAST BP >= 90 MM HG: CPT | Mod: CPTII,,, | Performed by: PHYSICIAN ASSISTANT

## 2024-02-21 PROCEDURE — 20610 DRAIN/INJ JOINT/BURSA W/O US: CPT | Mod: LT,,, | Performed by: PHYSICIAN ASSISTANT

## 2024-02-21 PROCEDURE — 1160F RVW MEDS BY RX/DR IN RCRD: CPT | Mod: CPTII,,, | Performed by: PHYSICIAN ASSISTANT

## 2024-02-21 PROCEDURE — 4010F ACE/ARB THERAPY RXD/TAKEN: CPT | Mod: CPTII,,, | Performed by: PHYSICIAN ASSISTANT

## 2024-02-21 PROCEDURE — 3008F BODY MASS INDEX DOCD: CPT | Mod: CPTII,,, | Performed by: PHYSICIAN ASSISTANT

## 2024-02-21 PROCEDURE — 1159F MED LIST DOCD IN RCRD: CPT | Mod: CPTII,,, | Performed by: PHYSICIAN ASSISTANT

## 2024-02-21 RX ORDER — DICLOFENAC SODIUM 75 MG/1
75 TABLET, DELAYED RELEASE ORAL 2 TIMES DAILY
Qty: 60 TABLET | Refills: 0 | Status: SHIPPED | OUTPATIENT
Start: 2024-02-21 | End: 2024-03-22

## 2024-02-21 RX ORDER — METHYLPREDNISOLONE ACETATE 80 MG/ML
80 INJECTION, SUSPENSION INTRA-ARTICULAR; INTRALESIONAL; INTRAMUSCULAR; SOFT TISSUE
Status: DISCONTINUED | OUTPATIENT
Start: 2024-02-21 | End: 2024-02-21 | Stop reason: HOSPADM

## 2024-02-21 RX ADMIN — METHYLPREDNISOLONE ACETATE 80 MG: 80 INJECTION, SUSPENSION INTRA-ARTICULAR; INTRALESIONAL; INTRAMUSCULAR; SOFT TISSUE at 03:02

## 2024-02-21 NOTE — PROCEDURES
Large Joint Aspiration/Injection: L knee    Date/Time: 2/21/2024 3:30 PM    Performed by: Ramesh Rosales PA  Authorized by: Ramesh Rosales PA    Consent Done?:  Yes (Verbal)  Indications:  Arthritis  Timeout: prior to procedure the correct patient, procedure, and site was verified    Prep: patient was prepped and draped in usual sterile fashion      Local anesthesia used?: Yes    Local anesthetic:  Lidocaine 2% without epinephrine    Details:  Needle Size:  25 G  Ultrasonic Guidance for needle placement?: No    Location:  Knee  Site:  L knee  Medications:  80 mg methylPREDNISolone acetate 80 mg/mL  Patient tolerance:  Patient tolerated the procedure well with no immediate complications

## 2024-02-21 NOTE — PROGRESS NOTES
Chief Complaint:   Chief Complaint   Patient presents with    Injury     Lt knee pain, giving out and popping and loss of strength, had knee scope done in 2007, pt states started recently, states has been taking ibproufen for pain relief, walks a lot for work        History of present illness:    48-year-old male presents office today for evaluation of his left knee pain and weakness.  This has been present for a couple months.  He has had issues with the knee dating back to 2007 where he underwent knee arthroscopy for torn cartilage.  At the time of the surgery he was told by his orthopedist that he had arthritis in the knee and then he would need future surgery.  He has had no tremendous pain but more weakness.  His pain is medial-sided when it is present.  He denies swelling, locking catching giving way episodes    Past Medical History:   Diagnosis Date    Restless legs     Ventral hernia        Past Surgical History:   Procedure Laterality Date    KNEE CARTILAGE SURGERY Left 2007    LAPAROSCOPIC REPAIR OF VENTRAL HERNIA N/A 7/22/2022    Procedure: REPAIR, HERNIA, VENTRAL, LAPAROSCOPIC;  Surgeon: Harsh Murphy MD;  Location: [facility];  Service: General;  Laterality: N/A;       Current Outpatient Medications   Medication Sig    ALPRAZolam (XANAX) 1 MG tablet Take 1 tablet (1 mg total) by mouth nightly as needed for Anxiety.    hydroCHLOROthiazide (MICROZIDE) 12.5 mg capsule Take 12.5 mg by mouth every morning.    HYDROcodone-acetaminophen (NORCO) 5-325 mg per tablet Take 1 tablet by mouth every 6 (six) hours as needed for Pain.    losartan (COZAAR) 25 MG tablet Take 25 mg by mouth every morning.    ondansetron (ZOFRAN-ODT) 4 MG TbDL Take 1 tablet (4 mg total) by mouth every 6 (six) hours as needed (nausea).    promethazine (PHENERGAN) 25 MG tablet Take 1 tablet (25 mg total) by mouth every 6 (six) hours as needed for Nausea.    rOPINIRole (REQUIP) 0.5 MG tablet 2-3 tabs nightly for RLS    tamsulosin (FLOMAX)  "0.4 mg Cap Take 1 capsule (0.4 mg total) by mouth once daily.    diclofenac (VOLTAREN) 75 MG EC tablet Take 1 tablet (75 mg total) by mouth 2 (two) times daily.     No current facility-administered medications for this visit.       Review of patient's allergies indicates:  No Known Allergies    History reviewed. No pertinent family history.    Social History     Socioeconomic History    Marital status:    Tobacco Use    Smoking status: Never    Smokeless tobacco: Never   Substance and Sexual Activity    Drug use: Never         Review of Systems:    Constitution:   Denies chills, fever, and sweats.  HENT:   Denies headaches or blurry vision.  Cardiovascular:  Denies chest pain or irregular heart beat.  Respiratory:   Denies cough or shortness of breath.  Gastrointestinal:  Denies abdominal pain, nausea, or vomiting.  Musculoskeletal:   Denies muscle cramps.  Neurological:   Denies dizziness or focal weakness.  Psychiatric/Behavior: Normal mental status.  Hematology/Lymph:  Denies bleeding problem or easy bruising/bleeding.  Skin:    Denies rash or suspicious lesions.    Examination:    Vital Signs:    Vitals:    02/21/24 1524   BP: (!) 145/98   Pulse: 89   Weight: 102.1 kg (225 lb)   Height: 6' 2" (1.88 m)       Body mass index is 28.89 kg/m².    Constitution:   Well-developed, well nourished patient in no acute distress.  Neurological:   Alert and oriented x 3 and cooperative to examination.     Psychiatric/Behavior: Normal mental status.  Respiratory:   No shortness of breath.  Nonlabored breathing  Cardiovascular:           Regular rate and rhythm  Eyes:    Extraoccular muscles intact  Skin:    No scars, rash or suspicious lesions.    Physical Exam:     Left Knee:     Grade effusion 0    No erythema or increased heat varus deformity    Patella demonstrated crepitus.    Anteromedial aspect was tender on palpation. Medial aspect was tender on palpation. Medial collateral ligament was tender on " palpation.    No lateral joint line tenderness   Active flexion 120 degrees   Active extension 5 degrees   antalgic gait was observed.     X-Ray Knee: A complete knee x-ray with standing for 4 views was performed of the left knee     IMPRESSIONS RADIOLOGY TEST   Moderate joint space narrowing in the medial compartment.  Well-maintained lateral and patellofemoral compartments    Kellgren Arben grade 3 changes        Assessment:     Primary osteoarthritis of left knee  -     X-Ray Knee Complete 4 or More Views Left; Future; Expected date: 02/21/2024  -     Large Joint Aspiration/Injection: L knee    Other orders  -     diclofenac (VOLTAREN) 75 MG EC tablet; Take 1 tablet (75 mg total) by mouth 2 (two) times daily.  Dispense: 60 tablet; Refill: 0        Plan:      I have discussed exam and x-ray findings with the patient today.  He has moderate to advanced osteoarthritis of the medial compartment.  His pain is not that severe today but he has some weakness noted.  I recommend a cortisone injection today, home exercises focusing on quad strengthening and oral diclofenac twice daily as needed for pain.  We have discussed future cortisone injections versus possible Synvisc injections.  Low-impact exercising and he will follow up with us as needed       Clinical Reference Documents Added to Patient Instructions         Document    POSTOP TOTAL KNEE REPLACEMENT EXERCISES LYING DOWN (ENGLISH)            No follow-ups on file.    DISCLAIMER: This note may have been dictated using voice recognition software and may contain grammatical errors.

## 2024-04-23 DIAGNOSIS — G25.81 RLS (RESTLESS LEGS SYNDROME): ICD-10-CM

## 2024-04-23 RX ORDER — ROPINIROLE 0.5 MG/1
TABLET, FILM COATED ORAL
Qty: 270 TABLET | Refills: 1 | Status: SHIPPED | OUTPATIENT
Start: 2024-04-23 | End: 2024-04-30 | Stop reason: SDUPTHER

## 2024-04-23 RX ORDER — ALPRAZOLAM 1 MG/1
1 TABLET ORAL NIGHTLY PRN
Qty: 90 TABLET | Refills: 0 | Status: SHIPPED | OUTPATIENT
Start: 2024-04-23 | End: 2024-04-30

## 2024-04-30 ENCOUNTER — OFFICE VISIT (OUTPATIENT)
Dept: NEUROLOGY | Facility: CLINIC | Age: 49
End: 2024-04-30
Payer: COMMERCIAL

## 2024-04-30 VITALS
HEIGHT: 74 IN | DIASTOLIC BLOOD PRESSURE: 92 MMHG | WEIGHT: 225 LBS | BODY MASS INDEX: 28.88 KG/M2 | SYSTOLIC BLOOD PRESSURE: 150 MMHG

## 2024-04-30 DIAGNOSIS — G47.33 OBSTRUCTIVE SLEEP APNEA: ICD-10-CM

## 2024-04-30 DIAGNOSIS — G25.81 RLS (RESTLESS LEGS SYNDROME): Primary | ICD-10-CM

## 2024-04-30 DIAGNOSIS — I10 HYPERTENSION, UNSPECIFIED TYPE: ICD-10-CM

## 2024-04-30 PROCEDURE — 99999 PR PBB SHADOW E&M-EST. PATIENT-LVL III: CPT | Mod: PBBFAC,,, | Performed by: NURSE PRACTITIONER

## 2024-04-30 PROCEDURE — 4010F ACE/ARB THERAPY RXD/TAKEN: CPT | Mod: CPTII,S$GLB,, | Performed by: NURSE PRACTITIONER

## 2024-04-30 PROCEDURE — 1159F MED LIST DOCD IN RCRD: CPT | Mod: CPTII,S$GLB,, | Performed by: NURSE PRACTITIONER

## 2024-04-30 PROCEDURE — 99214 OFFICE O/P EST MOD 30 MIN: CPT | Mod: S$GLB,,, | Performed by: NURSE PRACTITIONER

## 2024-04-30 PROCEDURE — 3080F DIAST BP >= 90 MM HG: CPT | Mod: CPTII,S$GLB,, | Performed by: NURSE PRACTITIONER

## 2024-04-30 PROCEDURE — 3008F BODY MASS INDEX DOCD: CPT | Mod: CPTII,S$GLB,, | Performed by: NURSE PRACTITIONER

## 2024-04-30 PROCEDURE — 3077F SYST BP >= 140 MM HG: CPT | Mod: CPTII,S$GLB,, | Performed by: NURSE PRACTITIONER

## 2024-04-30 RX ORDER — ALPRAZOLAM 0.5 MG/1
1 TABLET ORAL NIGHTLY PRN
Qty: 180 TABLET | Refills: 2 | Status: SHIPPED | OUTPATIENT
Start: 2024-04-30

## 2024-04-30 RX ORDER — DICLOFENAC SODIUM 75 MG/1
1 TABLET, DELAYED RELEASE ORAL NIGHTLY
COMMUNITY
Start: 2024-02-21

## 2024-04-30 RX ORDER — ROPINIROLE 0.5 MG/1
TABLET, FILM COATED ORAL
Qty: 270 TABLET | Refills: 2 | Status: SHIPPED | OUTPATIENT
Start: 2024-04-30

## 2024-04-30 NOTE — PROGRESS NOTES
"  Established Patient   SUBJECTIVE:    Patient ID: Ronal Pack , 48 y.o.    Past Medical History:   Diagnosis Date    Restless legs     Ventral hernia        Past Surgical History:   Procedure Laterality Date    KNEE CARTILAGE SURGERY Left 2007    LAPAROSCOPIC REPAIR OF VENTRAL HERNIA N/A 7/22/2022    Procedure: REPAIR, HERNIA, VENTRAL, LAPAROSCOPIC;  Surgeon: Harsh Murphy MD;  Location: [facility] OR;  Service: General;  Laterality: N/A;       Review of patient's allergies indicates:  No Known Allergies    Chief Complaint: RLS f/u    History of Present Illness:     Pt states he is doing well with his medication regimen.      Takes Requip to 0.5mg, 3 tabs nightly + Xanax 1 mg nightly  Pt feels this medication regimen has worked the best thus far     Snores  Gasps for air throughout night  No daytime fatigue    Review of Systems - as per HPI, otherwise a balanced 10 systems review is negative.      Current Medications:  Current Outpatient Medications   Medication Instructions    ALPRAZolam (XANAX) 1 mg, Oral, Nightly PRN    diclofenac (VOLTAREN) 75 MG EC tablet 1 tablet, Oral, Nightly    hydroCHLOROthiazide (MICROZIDE) 12.5 mg, Oral, Every morning    losartan (COZAAR) 25 mg, Oral, Every morning    ondansetron (ZOFRAN-ODT) 4 mg, Oral, Every 6 hours PRN    promethazine (PHENERGAN) 25 mg, Oral, Every 6 hours PRN    rOPINIRole (REQUIP) 0.5 MG tablet TAKE 2 TO 3 TABLETS BY MOUTH EVERY NIGHT FOR RESTLESS LEG SYNDROME         OBJECTIVE:    Vitals:  BP (!) 150/92 (BP Location: Right arm, Patient Position: Sitting)   Ht 6' 2.4" (1.89 m)   Wt 102.1 kg (225 lb)   BMI 28.58 kg/m²        Physical Exam:  Constitutional  he appears well-developed and well-nourished. he is well groomed.    Accompanied by - self  Appearance - well appearing, no apparent distress, unassisted  Class 4 pharynx  Skin- no obvious lesions noted    Neurologic  Cortical function - The patient is alert, attentive   Speech - clear   Cranial " nerves:  CN 3, 4, 6 EOMs - normal. No ptosis or lateral gaze deviation  CN 7 - no face asymmetry   CN 8 - hearing is grossly normal  Gait - unassisted; posture upright. gait is steady with normal steps       ASSESSMENT /PLAN:    Problem List Items Addressed This Visit          Neuro    RLS (restless legs syndrome) - Primary    Continue Requip to 0.5mg, 3 tabs nightly + Xanax 1 mg nightly         Cardiac/Vascular    Hypertension    Discussed that BP is elevated. Suggested the BP be monitored at home and report elevated BP to PCP. Diet and exercise recommended         Other    Obstructive sleep apnea    Will order HST to rule out URBANO       Questions and concerns were sought and answered to the patient's stated verbal satisfaction.    The patient verbalizes understanding and agreement with the above stated treatment plan.   Items discussed include acute and/or chronic neurological, sleep, or other issues and their attendant differential diagnoses.  Potential for additional testing, treatment options, and prognosis also discussed.    ___single dx _*__multiple issues/ diagnoses  ___ low __* mod ___ high complexity of data  ___low *__mod ___ high risks     Medical Decision Making (MDM) used for CPT choice:  ___low  _*__moderate  ____high        ANGEL Masterson  [facility]  [phone]

## 2024-06-13 ENCOUNTER — TELEPHONE (OUTPATIENT)
Dept: ORTHOPEDICS | Facility: CLINIC | Age: 49
End: 2024-06-13
Payer: COMMERCIAL

## 2024-06-13 NOTE — TELEPHONE ENCOUNTER
Due to Dr. Reed being out of the office we will have to reschedule this patient's appt.     I tried to call the patient but they didn't answer. I left them a voice mail so that way they can call the office back.

## 2024-06-18 NOTE — TELEPHONE ENCOUNTER
Tried to call the patient but he didn't answer. I rescheduled his appointment and let him know the new date and time in the voice mail. Also told the patient to call the office back with any questions or concerns.

## 2024-08-28 ENCOUNTER — OFFICE VISIT (OUTPATIENT)
Dept: ORTHOPEDICS | Facility: CLINIC | Age: 49
End: 2024-08-28
Payer: COMMERCIAL

## 2024-08-28 VITALS
HEART RATE: 60 BPM | SYSTOLIC BLOOD PRESSURE: 140 MMHG | BODY MASS INDEX: 29.65 KG/M2 | WEIGHT: 231 LBS | HEIGHT: 74 IN | DIASTOLIC BLOOD PRESSURE: 87 MMHG

## 2024-08-28 DIAGNOSIS — M17.11 PRIMARY OSTEOARTHRITIS OF RIGHT KNEE: ICD-10-CM

## 2024-08-28 DIAGNOSIS — M17.12 PRIMARY OSTEOARTHRITIS OF LEFT KNEE: Primary | ICD-10-CM

## 2024-08-28 RX ORDER — METHYLPREDNISOLONE ACETATE 80 MG/ML
80 INJECTION, SUSPENSION INTRA-ARTICULAR; INTRALESIONAL; INTRAMUSCULAR; SOFT TISSUE
Status: DISCONTINUED | OUTPATIENT
Start: 2024-08-28 | End: 2024-08-28 | Stop reason: HOSPADM

## 2024-08-28 RX ADMIN — METHYLPREDNISOLONE ACETATE 80 MG: 80 INJECTION, SUSPENSION INTRA-ARTICULAR; INTRALESIONAL; INTRAMUSCULAR; SOFT TISSUE at 02:08

## 2024-08-28 NOTE — PROCEDURES
Large Joint Aspiration/Injection: bilateral knee    Date/Time: 8/28/2024 2:00 PM    Performed by: Néstor Reed MD  Authorized by: Néstor Reed MD    Consent Done?:  Yes (Verbal)  Indications:  Arthritis  Timeout: prior to procedure the correct patient, procedure, and site was verified    Prep: patient was prepped and draped in usual sterile fashion      Local anesthesia used?: Yes    Local anesthetic:  Lidocaine 2% without epinephrine    Details:  Needle Size:  25 G  Ultrasonic Guidance for needle placement?: No    Location:  Knee  Laterality:  Bilateral  Site:  Bilateral knee  Medications (Right):  80 mg methylPREDNISolone acetate 80 mg/mL  Medications (Left):  80 mg methylPREDNISolone acetate 80 mg/mL  Patient tolerance:  Patient tolerated the procedure well with no immediate complications

## 2024-08-28 NOTE — PROGRESS NOTES
Past Medical History:   Diagnosis Date    Restless legs     Ventral hernia        Past Surgical History:   Procedure Laterality Date    KNEE CARTILAGE SURGERY Left 2007    LAPAROSCOPIC REPAIR OF VENTRAL HERNIA N/A 7/22/2022    Procedure: REPAIR, HERNIA, VENTRAL, LAPAROSCOPIC;  Surgeon: Harsh Murphy MD;  Location: [facility];  Service: General;  Laterality: N/A;       Current Outpatient Medications   Medication Sig    ALPRAZolam (XANAX) 0.5 MG tablet Take 2 tablets (1 mg total) by mouth nightly as needed for Insomnia (RLS).    diclofenac (VOLTAREN) 75 MG EC tablet Take 1 tablet by mouth every evening.    hydroCHLOROthiazide (MICROZIDE) 12.5 mg capsule Take 12.5 mg by mouth every morning.    losartan (COZAAR) 25 MG tablet Take 25 mg by mouth every morning.    ondansetron (ZOFRAN-ODT) 4 MG TbDL Take 1 tablet (4 mg total) by mouth every 6 (six) hours as needed (nausea).    promethazine (PHENERGAN) 25 MG tablet Take 1 tablet (25 mg total) by mouth every 6 (six) hours as needed for Nausea.    rOPINIRole (REQUIP) 0.5 MG tablet TAKE 2 TO 3 TABLETS BY MOUTH EVERY NIGHT FOR RESTLESS LEG SYNDROME     No current facility-administered medications for this visit.       Review of patient's allergies indicates:  No Known Allergies    No family history on file.    Social History     Socioeconomic History    Marital status:    Tobacco Use    Smoking status: Never    Smokeless tobacco: Never   Substance and Sexual Activity    Drug use: Never     Social Determinants of Health     Financial Resource Strain: Patient Declined (4/3/2024)    Received from [facility] Its Subsidiaries and Affiliates    Overall Financial Resource Strain (CARDIA)     Difficulty of Paying Living Expenses: Patient declined   Food Insecurity: Patient Declined (4/3/2024)    Received from [facility] Subsidiaries and Affiliates    Hunger Vital Sign     Worried About  Running Out of Food in the Last Year: Patient declined     Ran Out of Food in the Last Year: Patient declined   Transportation Needs: Patient Declined (4/3/2024)    [facility]    PRAPARE - Transportation     Lack of Transportation (Medical): Patient declined     Lack of Transportation (Non-Medical): Patient declined   Physical Activity: Sufficiently Active (4/3/2024)    [facility]    Exercise Vital Sign     Days of Exercise per Week: 5 days     Minutes of Exercise per Session: 150+ min   Stress: No Stress Concern Present (4/3/2024)    [facility]    [place] [employer] - Occupational Stress Questionnaire     Feeling of Stress : Not at all       Chief Complaint:   Chief Complaint   Patient presents with    Follow-up     6mo F/u left knee pain sp left knee cartilage removal in 2007. Doing well overall. Some pain starting to come back in his left knee. States injection last time 2/21/24 gave relief until about the beginning of this month. Would like to discuss options.       Consulting Physician: No ref. provider found    History of present illness:    This is a 48 y.o. year old male who complains of pain in both knees.  It is usually rates a 3 out of 10.  The injections in both knees 6 months ago lasted and gave him good pain relief for 5 months.  He hurts a little bit today and would like injections.    Review of Systems:    Constitution:   Denies chills, fever, and sweats.  HENT:   Denies headaches or blurry vision.  Cardiovascular:  Denies chest pain or irregular heart beat.  Respiratory:   Denies cough or shortness of breath.  Gastrointestinal:  Denies abdominal pain, nausea, or vomiting.  Musculoskeletal:   Denies muscle  "cramps.  Neurological:   Denies dizziness or focal weakness.  Psychiatric/Behavior: Normal mental status.  Hematology/Lymph:  Denies bleeding problem or easy bruising/bleeding.  Skin:    Denies rash or suspicious lesions.    Examination:    Vital Signs:    Vitals:    24 1358   BP: (!) 140/87   Pulse: 60   Weight: 104.8 kg (231 lb)   Height: 6' 2.4" (1.89 m)       Body mass index is 29.34 kg/m².    Constitution:   Well-developed, well nourished patient in no acute distress.  Neurological:   Alert and oriented x 3 and cooperative to examination.     Psychiatric/Behavior: Normal mental status.  Respiratory:   No shortness of breath.non labored breathing.  Cardiovascular: Regular rate and rhythm  Eyes:    Extraoccular muscles intact  Skin:    No scars, rash or suspicious lesions.    Physical Exam:     General Musculoskeletal Exam   Gait: antalgic       Right Knee Exam     Inspection   Erythema: absent  Effusion: present  Deformity: present  Bruising: absent    Tenderness   The patient is tender to palpation of the medial joint line, MCL, condyle and medial retinaculum.    Crepitus   The patient has crepitus of the medial joint line.    Range of Motion   Extension: abnormal   Flexion: abnormal   0° to 130°    Tests   Meniscus   Joshua:  Medial - positive Lateral - negative  Ligament Examination   MCL - Valgus: normal (0 to 2mm)  LCL - Varus: normal  Patella   Passive Patellar Tilt: neutral    Other   Sensation: normal    Comments:  Varus deformity    Muscle Strength   Right Lower Extremity   Quadriceps:  5/5   Hamstrin/5     Vascular Exam     Right Pulses  Dorsalis Pedis:      2+  Posterior Tibial:      2+          General Musculoskeletal Exam   Gait: antalgic         Left Knee Exam     Inspection   Erythema: absent  Effusion: present  Deformity: present  Bruising: absent    Tenderness   The patient tender to palpation of the condyle, MCL, medial retinaculum and medial joint line.    Crepitus   The patient " has crepitus of the medial joint line.    Range of Motion   Extension: abnormal   Flexion: abnormal   0° to 130°    Tests   Meniscus   Joshua:  Medial - positive Lateral - negative  Stability   MCL - Valgus: normal (0 to 2mm)  LCL - Varus: normal (0 to 2mm)  Patella   Passive Patellar Tilt: neutral    Other   Sensation: normal    Comments:  Varus deformity    Muscle Strength   Left Lower Extremity   Quadriceps:  5/5   Hamstrin/5     Vascular Exam       Left Pulses  Dorsalis Pedis:      2+  Posterior Tibial:      2+          Imaging: X-rays ordered and images interpreted today personally by me of four views of the right and left knees which show moderate to advanced osteoarthrosis with medial compartment cartilage space narrowing and tricompartmental osteophytes.  Impression: Moderate to advanced osteoarthrosis medial compartment of the right and left knees.         Assessment: Primary osteoarthritis of left knee    Primary osteoarthritis of right knee        Plan:  After verbal consent and sterile prep each knee was injected with corticosteroid and lidocaine.  Patient tolerated procedure well no complications.  Follow up in 6 months with new radiographs of both knees.      DISCLAIMER: This note may have been dictated using voice recognition software and may contain grammatical errors.     NOTE: Consult report sent to referring provider via [employer].

## 2025-01-23 DIAGNOSIS — G25.81 RLS (RESTLESS LEGS SYNDROME): ICD-10-CM

## 2025-01-23 RX ORDER — ALPRAZOLAM 0.5 MG/1
1 TABLET ORAL NIGHTLY PRN
Qty: 180 TABLET | Refills: 0 | Status: SHIPPED | OUTPATIENT
Start: 2025-01-23

## 2025-04-07 ENCOUNTER — PATIENT MESSAGE (OUTPATIENT)
Dept: NEUROLOGY | Facility: CLINIC | Age: 50
End: 2025-04-07
Payer: COMMERCIAL

## 2025-04-08 ENCOUNTER — TELEPHONE (OUTPATIENT)
Dept: NEUROLOGY | Facility: CLINIC | Age: 50
End: 2025-04-08
Payer: COMMERCIAL

## 2025-04-08 DIAGNOSIS — G25.81 RLS (RESTLESS LEGS SYNDROME): ICD-10-CM

## 2025-04-08 RX ORDER — ROPINIROLE 0.5 MG/1
TABLET, FILM COATED ORAL
Qty: 270 TABLET | Refills: 2 | Status: SHIPPED | OUTPATIENT
Start: 2025-04-08

## 2025-04-08 RX ORDER — ALPRAZOLAM 0.5 MG/1
1 TABLET ORAL NIGHTLY PRN
Qty: 180 TABLET | Refills: 0 | Status: SHIPPED | OUTPATIENT
Start: 2025-04-08

## 2025-04-08 NOTE — TELEPHONE ENCOUNTER
----- Message from Geovanna sent at 4/3/2025  7:41 AM CDT -----  Regarding: Patient rescheduled  Good Morning Diana Moved Patient till 24 th but he will be out of medication before his next appt.  Thanks Gio you feel better soon !!

## 2025-04-24 ENCOUNTER — OFFICE VISIT (OUTPATIENT)
Facility: CLINIC | Age: 50
End: 2025-04-24
Payer: COMMERCIAL

## 2025-04-24 VITALS
SYSTOLIC BLOOD PRESSURE: 128 MMHG | HEIGHT: 74 IN | WEIGHT: 228 LBS | BODY MASS INDEX: 29.26 KG/M2 | DIASTOLIC BLOOD PRESSURE: 78 MMHG

## 2025-04-24 DIAGNOSIS — G25.81 RLS (RESTLESS LEGS SYNDROME): Primary | ICD-10-CM

## 2025-04-24 PROCEDURE — 1159F MED LIST DOCD IN RCRD: CPT | Mod: CPTII,S$GLB,,

## 2025-04-24 PROCEDURE — 1160F RVW MEDS BY RX/DR IN RCRD: CPT | Mod: CPTII,S$GLB,,

## 2025-04-24 PROCEDURE — 99999 PR PBB SHADOW E&M-EST. PATIENT-LVL III: CPT | Mod: PBBFAC,,,

## 2025-04-24 PROCEDURE — 4010F ACE/ARB THERAPY RXD/TAKEN: CPT | Mod: CPTII,S$GLB,,

## 2025-04-24 PROCEDURE — 3074F SYST BP LT 130 MM HG: CPT | Mod: CPTII,S$GLB,,

## 2025-04-24 PROCEDURE — 3078F DIAST BP <80 MM HG: CPT | Mod: CPTII,S$GLB,,

## 2025-04-24 PROCEDURE — 99214 OFFICE O/P EST MOD 30 MIN: CPT | Mod: S$GLB,,,

## 2025-04-24 PROCEDURE — 3008F BODY MASS INDEX DOCD: CPT | Mod: CPTII,S$GLB,,

## 2025-04-24 RX ORDER — GABAPENTIN 100 MG/1
CAPSULE ORAL
Qty: 60 CAPSULE | Refills: 5 | Status: SHIPPED | OUTPATIENT
Start: 2025-04-24

## 2025-04-24 RX ORDER — IPRATROPIUM BROMIDE 42 UG/1
SPRAY, METERED NASAL
COMMUNITY
Start: 2024-11-20

## 2025-04-24 NOTE — PROGRESS NOTES
Neurology    Established Patient   SUBJECTIVE:    Patient ID: Ronal Pack is a 49 y.o. male.    Chief Complaint: RLS f/u    History of Present Illness:     Pt presents for RLS follow up.    Reports his RLS is about 80-85% controlled. Has good nights about 4-5 times per week and usually has 2 bad nights where it'll take an hour or so to fall asleep 2.2 restless legs. Currently taking ropinirole 0.5mg, 3 tabs qhs and xanax 0.5mg, 2 tabs qhs.     He's noticed an increase in OCD tendencies over the past year since ropinirole dose was increased (from 1mg to 1.5mg qhs). He has had OCD since childhood but it was well-controlled for many years without medication until this past year. He would like to try to wean the dose of ropinirole down some. He has plans to visit with a psychiatrist in the near future.     After last visit he decided to hold off on HST. Did not feel strongly that he needed to r/o URBANO--no EDS or un-refreshed awakenings, minimal snoring, no witnessed apnea.     Review of Systems -  as per HPI, otherwise pertinent systems review is negative           Past Medical History:   Diagnosis Date    Restless legs     Ventral hernia        Past Surgical History:   Procedure Laterality Date    KNEE CARTILAGE SURGERY Left 2007    LAPAROSCOPIC REPAIR OF VENTRAL HERNIA N/A 7/22/2022    Procedure: REPAIR, HERNIA, VENTRAL, LAPAROSCOPIC;  Surgeon: Harsh Murphy MD;  Location: [facility];  Service: General;  Laterality: N/A;       No family history on file.      Review of patient's allergies indicates:  No Known Allergies    Current Outpatient Medications   Medication Instructions    ALPRAZolam (XANAX) 1 mg, Oral, Nightly PRN    diclofenac (VOLTAREN) 75 MG EC tablet 1 tablet, Nightly    hydroCHLOROthiazide (MICROZIDE) 12.5 mg, Every morning    ipratropium (ATROVENT) 42 mcg (0.06 %) nasal spray USE 2 SPRAYS IN EACH NOSTRIL IN THE MORNING, 2 SPRAYS AT NOON, 2 SPRAYS IN THE EVENING AND 2 SPRAYS BEFORE BEDTIME     "losartan (COZAAR) 25 mg, Every morning    ondansetron (ZOFRAN-ODT) 4 mg, Oral, Every 6 hours PRN    promethazine (PHENERGAN) 25 mg, Oral, Every 6 hours PRN    rOPINIRole (REQUIP) 0.5 MG tablet TAKE 2 TO 3 TABLETS BY MOUTH EVERY NIGHT FOR RESTLESS LEG SYNDROME       OBJECTIVE:    Vitals:  Visit Vitals  /78 (BP Location: Left arm)   Ht 6' 2" (1.88 m)   Wt 103.4 kg (228 lb)   BMI 29.27 kg/m²       Physical Exam:  Constitutional  he appears well-developed and well-nourished. he is well groomed.    Accompanied by - alone  Appearance - well appearing, no apparent distress, unassisted  Oropharynx - Mallampati: class 4  Heart - RRR auscultated without murmur  Lungs - CTA, pulmonary effort normal  Skin- no obvious lesions noted    Neurologic  Cortical function - The patient is alert, attentive, and oriented  Speech - clear   Cranial nerves:  CN 3, 4, 6 EOMs - normal. No ptosis, nystagmus, or lateral gaze deviation  CN 7 - no face asymmetry   CN 8 - hearing is grossly normal  Gait - unassisted; posture upright. Gait is steady with normal steps    Labs:   Iron panel within normal range     ASSESSMENT/PLAN:    1. RLS (restless legs syndrome)  -     gabapentin (NEURONTIN) 100 MG capsule; Take 1-2 caps nightly  Dispense: 60 capsule; Refill: 5    Add gabapentin in attempts to wean ropinirole.   Gabapentin 100mg qhs 1-2 caps nightly. Instructed pt to take 1 cap nightly for at least a week, then increase to 2 caps nightly if needed. If symptoms improve he can decrease ropinirole to 2 tabs nightly.   Continue the xanax 0.5mg, 2 tabs nightly prn  Ropinirole 0.5mg 2-3 tabs nightly  Also advised he can try adding magnesium 250-500mg qhs    Follow-up 3 months.  In addition to their scheduled follow up, the patient has also been instructed to follow up on as needed basis.     Questions and concerns were sought and answered to the patient's stated verbal satisfaction.    The patient verbalizes understanding and agreement with the " above stated treatment plan.   Items discussed include acute and/or chronic neurological, sleep, or other issues and their attendant differential diagnoses.  Potential for additional testing, treatment options, and prognosis also discussed.  Dr. Baron Garcia available during encounter.    Courtney Harrison, MSN, APRN, FNP-C  [facility]  [phone]

## 2025-07-22 DIAGNOSIS — G25.81 RLS (RESTLESS LEGS SYNDROME): ICD-10-CM

## 2025-07-22 RX ORDER — ALPRAZOLAM 0.5 MG/1
1 TABLET ORAL NIGHTLY PRN
Qty: 180 TABLET | Refills: 1 | Status: SHIPPED | OUTPATIENT
Start: 2025-07-22

## 2025-08-06 ENCOUNTER — PATIENT MESSAGE (OUTPATIENT)
Dept: NEUROLOGY | Facility: CLINIC | Age: 50
End: 2025-08-06
Payer: COMMERCIAL

## 2025-08-25 ENCOUNTER — PATIENT MESSAGE (OUTPATIENT)
Facility: CLINIC | Age: 50
End: 2025-08-25

## 2025-08-25 ENCOUNTER — OFFICE VISIT (OUTPATIENT)
Facility: CLINIC | Age: 50
End: 2025-08-25
Payer: COMMERCIAL

## 2025-08-25 DIAGNOSIS — G25.81 RLS (RESTLESS LEGS SYNDROME): Primary | ICD-10-CM

## 2025-08-25 PROCEDURE — 1159F MED LIST DOCD IN RCRD: CPT | Mod: CPTII,95,,

## 2025-08-25 PROCEDURE — 1160F RVW MEDS BY RX/DR IN RCRD: CPT | Mod: CPTII,95,,

## 2025-08-25 PROCEDURE — 98006 SYNCH AUDIO-VIDEO EST MOD 30: CPT | Mod: 95,,,

## 2025-08-25 PROCEDURE — 4010F ACE/ARB THERAPY RXD/TAKEN: CPT | Mod: CPTII,95,,

## 2025-08-25 RX ORDER — ROPINIROLE 0.5 MG/1
TABLET, FILM COATED ORAL
Qty: 270 TABLET | Refills: 2 | Status: SHIPPED | OUTPATIENT
Start: 2025-08-25